# Patient Record
Sex: MALE | Race: WHITE | NOT HISPANIC OR LATINO | URBAN - METROPOLITAN AREA
[De-identification: names, ages, dates, MRNs, and addresses within clinical notes are randomized per-mention and may not be internally consistent; named-entity substitution may affect disease eponyms.]

---

## 2020-08-29 ENCOUNTER — OFFICE VISIT (OUTPATIENT)
Dept: URBAN - METROPOLITAN AREA CLINIC 121 | Facility: CLINIC | Age: 70
End: 2020-08-29

## 2021-03-03 ENCOUNTER — OFFICE VISIT (OUTPATIENT)
Dept: URBAN - METROPOLITAN AREA CLINIC 60 | Facility: CLINIC | Age: 71
End: 2021-03-03

## 2021-04-29 ENCOUNTER — OFFICE VISIT (OUTPATIENT)
Dept: URBAN - METROPOLITAN AREA SURGERY CENTER 4 | Facility: SURGERY CENTER | Age: 71
End: 2021-04-29

## 2021-05-03 LAB — PATHOLOGY (INDENTED REPORT): (no result)

## 2021-05-13 ENCOUNTER — OFFICE VISIT (OUTPATIENT)
Dept: URBAN - METROPOLITAN AREA CLINIC 60 | Facility: CLINIC | Age: 71
End: 2021-05-13

## 2021-06-22 ENCOUNTER — OFFICE VISIT (OUTPATIENT)
Dept: URBAN - METROPOLITAN AREA CLINIC 63 | Facility: CLINIC | Age: 71
End: 2021-06-22

## 2021-06-23 ENCOUNTER — OFFICE VISIT (OUTPATIENT)
Dept: URBAN - METROPOLITAN AREA CLINIC 60 | Facility: CLINIC | Age: 71
End: 2021-06-23

## 2021-09-22 ENCOUNTER — OFFICE VISIT (OUTPATIENT)
Dept: URBAN - METROPOLITAN AREA CLINIC 60 | Facility: CLINIC | Age: 71
End: 2021-09-22

## 2021-10-11 ENCOUNTER — OFFICE VISIT (OUTPATIENT)
Dept: URBAN - METROPOLITAN AREA CLINIC 121 | Facility: CLINIC | Age: 71
End: 2021-10-11

## 2021-11-17 ENCOUNTER — OFFICE VISIT (OUTPATIENT)
Dept: URBAN - METROPOLITAN AREA CLINIC 60 | Facility: CLINIC | Age: 71
End: 2021-11-17

## 2021-11-24 ENCOUNTER — OFFICE VISIT (OUTPATIENT)
Dept: URBAN - METROPOLITAN AREA CLINIC 60 | Facility: CLINIC | Age: 71
End: 2021-11-24

## 2022-02-23 ENCOUNTER — OFFICE VISIT (OUTPATIENT)
Dept: URBAN - METROPOLITAN AREA CLINIC 60 | Facility: CLINIC | Age: 72
End: 2022-02-23

## 2022-03-23 ENCOUNTER — OFFICE VISIT (OUTPATIENT)
Dept: URBAN - METROPOLITAN AREA CLINIC 60 | Facility: CLINIC | Age: 72
End: 2022-03-23

## 2022-07-09 ENCOUNTER — TELEPHONE ENCOUNTER (OUTPATIENT)
Dept: URBAN - METROPOLITAN AREA CLINIC 121 | Facility: CLINIC | Age: 72
End: 2022-07-09

## 2022-07-09 RX ORDER — LORATADINE 10 MG/1
TABLET ORAL ONCE A DAY
Refills: 0 | OUTPATIENT
Start: 2021-03-03 | End: 2021-05-13

## 2022-07-09 RX ORDER — CHOLESTYRAMINE 4 G/5.5G
POWDER, FOR SUSPENSION ORAL ONCE A DAY
Refills: 0 | OUTPATIENT
Start: 2021-02-22 | End: 2021-05-13

## 2022-07-09 RX ORDER — CELECOXIB 400 MG/1
CAPSULE ORAL
Refills: 0 | OUTPATIENT
Start: 2021-11-24 | End: 2022-02-23

## 2022-07-09 RX ORDER — CELECOXIB 400 MG/1
CAPSULE ORAL
Refills: 0 | OUTPATIENT
Start: 2021-05-13 | End: 2021-09-22

## 2022-07-09 RX ORDER — CELECOXIB 100 MG/1
CAPSULE ORAL TAKE AS DIRECTED
Refills: 0 | OUTPATIENT
Start: 2021-03-03 | End: 2021-05-13

## 2022-07-09 RX ORDER — ELECTROLYTES/DEXTROSE
SOLUTION, ORAL ORAL ONCE A DAY
Refills: 0 | OUTPATIENT
Start: 2021-09-22 | End: 2021-11-24

## 2022-07-09 RX ORDER — MESALAMINE 1.2 G/1
2 TWICE A DAY TABLET, DELAYED RELEASE ORAL
Refills: 3 | OUTPATIENT
Start: 2022-01-04 | End: 2022-03-23

## 2022-07-09 RX ORDER — AMLODIPINE BESYLATE 10 MG/1
TABLET ORAL ONCE A DAY
Refills: 0 | OUTPATIENT
Start: 2021-11-24 | End: 2022-02-23

## 2022-07-09 RX ORDER — LISINOPRIL 20 MG/1
TABLET ORAL TWICE A DAY
Refills: 0 | OUTPATIENT
Start: 2021-09-22 | End: 2021-11-24

## 2022-07-09 RX ORDER — CITALOPRAM 20 MG/1
TABLET ORAL ONCE A DAY
Refills: 0 | OUTPATIENT
Start: 2021-05-13 | End: 2021-09-22

## 2022-07-09 RX ORDER — METOPROLOL TARTRATE 50 MG/1
TABLET, FILM COATED ORAL TWICE A DAY
Refills: 0 | OUTPATIENT
Start: 2021-05-13 | End: 2021-09-22

## 2022-07-09 RX ORDER — FLUTICASONE PROPIONATE 50 UG/1
PRN SPRAY, METERED NASAL
Refills: 0 | OUTPATIENT
Start: 2021-11-24 | End: 2022-02-23

## 2022-07-09 RX ORDER — DIGOXIN 0.25 MG/1
TABLET ORAL
Refills: 0 | OUTPATIENT
Start: 2021-11-24 | End: 2022-02-23

## 2022-07-09 RX ORDER — LORAZEPAM 2 MG/1
TABLET ORAL ONCE A DAY
Refills: 0 | OUTPATIENT
Start: 2021-02-15 | End: 2021-05-13

## 2022-07-09 RX ORDER — CITALOPRAM 20 MG/1
TABLET ORAL ONCE A DAY
Refills: 0 | OUTPATIENT
Start: 2021-11-24 | End: 2022-02-23

## 2022-07-09 RX ORDER — METOPROLOL TARTRATE 50 MG/1
TABLET, FILM COATED ORAL TWICE A DAY
Refills: 0 | OUTPATIENT
Start: 2021-11-24 | End: 2022-02-23

## 2022-07-09 RX ORDER — AMLODIPINE BESYLATE 10 MG/1
TABLET ORAL ONCE A DAY
Refills: 0 | OUTPATIENT
Start: 2021-06-22 | End: 2021-09-22

## 2022-07-09 RX ORDER — ALIROCUMAB 75 MG/ML
INJECTION, SOLUTION SUBCUTANEOUS ONCE A DAY
Refills: 0 | OUTPATIENT
Start: 2021-02-02 | End: 2021-05-13

## 2022-07-09 RX ORDER — FLUTICASONE PROPIONATE 50 UG/1
PRN SPRAY, METERED NASAL
Refills: 0 | OUTPATIENT
Start: 2021-09-22 | End: 2021-11-24

## 2022-07-09 RX ORDER — GUAIFEN/PHENYLEPH/ACETAMINOPHN 200-5-325
TABLET ORAL ONCE A DAY
Refills: 0 | OUTPATIENT
Start: 2021-03-03 | End: 2021-05-13

## 2022-07-09 RX ORDER — CITALOPRAM 20 MG/1
TABLET ORAL ONCE A DAY
Refills: 0 | OUTPATIENT
Start: 2021-02-24 | End: 2021-05-13

## 2022-07-09 RX ORDER — LISINOPRIL 20 MG/1
TABLET ORAL TWICE A DAY
Refills: 0 | OUTPATIENT
Start: 2021-11-24 | End: 2022-02-23

## 2022-07-09 RX ORDER — MESALAMINE 1.2 G/1
2 TWICE A DAY TABLET, DELAYED RELEASE ORAL
Refills: 1 | OUTPATIENT
Start: 2021-10-07 | End: 2021-11-22

## 2022-07-09 RX ORDER — AMLODIPINE BESYLATE 10 MG/1
TABLET ORAL
Refills: 0 | OUTPATIENT
Start: 2022-02-19 | End: 2022-02-23

## 2022-07-09 RX ORDER — PANTOPRAZOLE SODIUM 40 MG/1
GRANULE, DELAYED RELEASE ORAL ONCE A DAY
Refills: 0 | OUTPATIENT
Start: 2021-11-24 | End: 2022-02-23

## 2022-07-09 RX ORDER — ALIROCUMAB 75 MG/ML
INJECTION, SOLUTION SUBCUTANEOUS ONCE A DAY
Refills: 0 | OUTPATIENT
Start: 2021-09-22 | End: 2021-11-24

## 2022-07-09 RX ORDER — ASPIRIN 81 MG/1
TABLET, FILM COATED ORAL ONCE A DAY
Refills: 0 | OUTPATIENT
Start: 2021-03-03 | End: 2021-05-13

## 2022-07-09 RX ORDER — FLUTICASONE PROPIONATE 50 UG/1
PRN SPRAY, METERED NASAL
Refills: 0 | OUTPATIENT
Start: 2021-05-13 | End: 2021-09-22

## 2022-07-09 RX ORDER — CITALOPRAM 20 MG/1
TABLET ORAL ONCE A DAY
Refills: 0 | OUTPATIENT
Start: 2021-09-22 | End: 2021-11-24

## 2022-07-09 RX ORDER — ALIROCUMAB 75 MG/ML
INJECTION, SOLUTION SUBCUTANEOUS ONCE A DAY
Refills: 0 | OUTPATIENT
Start: 2021-05-13 | End: 2021-09-22

## 2022-07-09 RX ORDER — FLUTICASONE PROPIONATE 50 UG/1
PRN SPRAY, METERED NASAL
Refills: 0 | OUTPATIENT
Start: 2021-05-13 | End: 2021-05-13

## 2022-07-09 RX ORDER — WARFARIN 5 MG/1
TABLET ORAL
Refills: 0 | OUTPATIENT
Start: 2022-02-18 | End: 2022-02-23

## 2022-07-09 RX ORDER — ALIROCUMAB 75 MG/ML
INJECTION, SOLUTION SUBCUTANEOUS ONCE A DAY
Refills: 0 | OUTPATIENT
Start: 2021-11-24 | End: 2022-02-23

## 2022-07-09 RX ORDER — LORATADINE 10 MG/1
TABLET ORAL ONCE A DAY
Refills: 0 | OUTPATIENT
Start: 2021-11-24 | End: 2022-02-23

## 2022-07-09 RX ORDER — CHOLESTYRAMINE 4 G/5.5G
POWDER, FOR SUSPENSION ORAL ONCE A DAY
Refills: 0 | OUTPATIENT
Start: 2021-05-13 | End: 2021-09-22

## 2022-07-09 RX ORDER — LORAZEPAM 2 MG/1
TABLET ORAL ONCE A DAY
Refills: 0 | OUTPATIENT
Start: 2021-05-13 | End: 2021-09-22

## 2022-07-09 RX ORDER — CELECOXIB 100 MG/1
CAPSULE ORAL TAKE AS DIRECTED
Refills: 0 | OUTPATIENT
Start: 2021-05-13 | End: 2021-05-13

## 2022-07-09 RX ORDER — METOPROLOL TARTRATE 50 MG/1
TABLET, FILM COATED ORAL TWICE A DAY
Refills: 0 | OUTPATIENT
Start: 2021-02-13 | End: 2021-05-13

## 2022-07-09 RX ORDER — MV-MIN/FOLIC/VIT K/LYCOP/COQ10 200-100MCG
CAPSULE ORAL ONCE A DAY
Refills: 0 | OUTPATIENT
Start: 2021-03-03 | End: 2021-05-13

## 2022-07-09 RX ORDER — BUDESONIDE 3 MG/1
3 ONCE A DAY CAPSULE, COATED PELLETS ORAL
Refills: 1 | OUTPATIENT
Start: 2021-05-13 | End: 2022-02-23

## 2022-07-09 RX ORDER — FUROSEMIDE 20 MG/1
TABLET ORAL
Refills: 0 | OUTPATIENT
Start: 2021-11-24 | End: 2022-02-23

## 2022-07-09 RX ORDER — FUROSEMIDE 20 MG/1
TABLET ORAL
Refills: 0 | OUTPATIENT
Start: 2021-11-03 | End: 2021-11-24

## 2022-07-09 RX ORDER — WARFARIN 5 MG/1
TABLET ORAL ONCE A DAY
Refills: 0 | OUTPATIENT
Start: 2021-03-03 | End: 2021-05-13

## 2022-07-09 RX ORDER — ONDANSETRON 4 MG/1
USE AS DIRECTED ONE TABLET 30 MINUTES BEFORE EACH DOSE OF LAXATIVE FOR BOWEL PREP TABLET, ORALLY DISINTEGRATING ORAL
Refills: 0 | OUTPATIENT
Start: 2021-03-03 | End: 2021-11-24

## 2022-07-09 RX ORDER — LORAZEPAM 2 MG/1
TABLET ORAL ONCE A DAY
Refills: 0 | OUTPATIENT
Start: 2021-11-24 | End: 2022-02-23

## 2022-07-09 RX ORDER — BUDESONIDE 3 MG/1
3 ONCE A DAY CAPSULE, COATED PELLETS ORAL
Refills: 1 | OUTPATIENT
Start: 2021-05-04 | End: 2021-05-13

## 2022-07-09 RX ORDER — GUAIFEN/PHENYLEPH/ACETAMINOPHN 200-5-325
TABLET ORAL ONCE A DAY
Refills: 0 | OUTPATIENT
Start: 2021-11-24 | End: 2022-02-23

## 2022-07-09 RX ORDER — ASPIRIN 81 MG/1
TABLET, COATED ORAL ONCE A DAY
Refills: 0 | OUTPATIENT
Start: 2021-11-24 | End: 2022-02-23

## 2022-07-09 RX ORDER — AMLODIPINE BESYLATE 10 MG/1
TABLET ORAL ONCE A DAY
Refills: 0 | OUTPATIENT
Start: 2021-09-22 | End: 2021-11-24

## 2022-07-09 RX ORDER — GUAIFEN/PHENYLEPH/ACETAMINOPHN 200-5-325
TABLET ORAL ONCE A DAY
Refills: 0 | OUTPATIENT
Start: 2021-09-22 | End: 2021-11-24

## 2022-07-09 RX ORDER — WARFARIN 5 MG/1
TABLET ORAL ONCE A DAY
Refills: 0 | OUTPATIENT
Start: 2021-05-13 | End: 2021-09-22

## 2022-07-09 RX ORDER — DIGOXIN 0.25 MG/1
TABLET ORAL
Refills: 0 | OUTPATIENT
Start: 2021-11-05 | End: 2021-11-24

## 2022-07-09 RX ORDER — CELECOXIB 200 MG/1
CAPSULE ORAL
Refills: 0 | OUTPATIENT
Start: 2022-01-28 | End: 2022-02-23

## 2022-07-09 RX ORDER — WARFARIN 5 MG/1
TABLET ORAL ONCE A DAY
Refills: 0 | OUTPATIENT
Start: 2021-11-24 | End: 2022-02-23

## 2022-07-09 RX ORDER — LORAZEPAM 2 MG/1
TABLET ORAL
Refills: 0 | OUTPATIENT
Start: 2022-02-04 | End: 2022-02-23

## 2022-07-09 RX ORDER — METOPROLOL TARTRATE 50 MG/1
TABLET, FILM COATED ORAL TWICE A DAY
Refills: 0 | OUTPATIENT
Start: 2021-09-22 | End: 2021-11-24

## 2022-07-09 RX ORDER — CELECOXIB 400 MG/1
CAPSULE ORAL
Refills: 0 | OUTPATIENT
Start: 2021-09-22 | End: 2021-11-24

## 2022-07-09 RX ORDER — ELECTROLYTES/DEXTROSE
SOLUTION, ORAL ORAL ONCE A DAY
Refills: 0 | OUTPATIENT
Start: 2021-05-13 | End: 2021-05-13

## 2022-07-09 RX ORDER — PANTOPRAZOLE SODIUM 40 MG/1
GRANULE, DELAYED RELEASE ORAL ONCE A DAY
Refills: 0 | OUTPATIENT
Start: 2021-09-22 | End: 2021-11-24

## 2022-07-09 RX ORDER — MESALAMINE 1.2 G/1
2 TWICE A DAY TABLET, DELAYED RELEASE ORAL
Refills: 1 | OUTPATIENT
Start: 2021-11-22 | End: 2022-01-04

## 2022-07-09 RX ORDER — LORATADINE 10 MG/1
TABLET ORAL ONCE A DAY
Refills: 0 | OUTPATIENT
Start: 2021-09-22 | End: 2021-11-24

## 2022-07-09 RX ORDER — LORATADINE 10 MG/1
TABLET ORAL ONCE A DAY
Refills: 0 | OUTPATIENT
Start: 2021-05-13 | End: 2021-09-22

## 2022-07-09 RX ORDER — LISINOPRIL 10 MG/1
TABLET ORAL ONCE A DAY
Refills: 0 | OUTPATIENT
Start: 2021-02-08 | End: 2021-05-13

## 2022-07-09 RX ORDER — LORAZEPAM 2 MG/1
TABLET ORAL ONCE A DAY
Refills: 0 | OUTPATIENT
Start: 2021-09-22 | End: 2021-11-24

## 2022-07-09 RX ORDER — GUAIFEN/PHENYLEPH/ACETAMINOPHN 200-5-325
TABLET ORAL ONCE A DAY
Refills: 0 | OUTPATIENT
Start: 2021-05-13 | End: 2021-09-22

## 2022-07-09 RX ORDER — WARFARIN 5 MG/1
TABLET ORAL ONCE A DAY
Refills: 0 | OUTPATIENT
Start: 2021-09-22 | End: 2021-11-24

## 2022-07-09 RX ORDER — LISINOPRIL 20 MG/1
TABLET ORAL TWICE A DAY
Refills: 0 | OUTPATIENT
Start: 2021-06-22 | End: 2021-09-22

## 2022-07-09 RX ORDER — ASPIRIN 81 MG/1
TABLET, COATED ORAL ONCE A DAY
Refills: 0 | OUTPATIENT
Start: 2021-09-22 | End: 2021-11-24

## 2022-07-09 RX ORDER — ELECTROLYTES/DEXTROSE
SOLUTION, ORAL ORAL ONCE A DAY
Refills: 0 | OUTPATIENT
Start: 2021-05-13 | End: 2021-09-22

## 2022-07-09 RX ORDER — LISINOPRIL 10 MG/1
TABLET ORAL ONCE A DAY
Refills: 0 | OUTPATIENT
Start: 2021-05-13 | End: 2021-06-22

## 2022-07-09 RX ORDER — PANTOPRAZOLE SODIUM 40 MG/1
GRANULE, DELAYED RELEASE ORAL ONCE A DAY
Refills: 0 | OUTPATIENT
Start: 2021-05-13 | End: 2021-09-22

## 2022-07-09 RX ORDER — ELECTROLYTES/DEXTROSE
SOLUTION, ORAL ORAL ONCE A DAY
Refills: 0 | OUTPATIENT
Start: 2021-11-24 | End: 2022-02-23

## 2022-07-09 RX ORDER — PANTOPRAZOLE SODIUM 40 MG/1
GRANULE, DELAYED RELEASE ORAL ONCE A DAY
Refills: 0 | OUTPATIENT
Start: 2021-03-03 | End: 2021-05-13

## 2022-07-09 RX ORDER — ALIROCUMAB 75 MG/ML
INJECTION, SOLUTION SUBCUTANEOUS
Refills: 0 | OUTPATIENT
Start: 2022-02-15 | End: 2022-02-23

## 2022-07-10 ENCOUNTER — TELEPHONE ENCOUNTER (OUTPATIENT)
Dept: URBAN - METROPOLITAN AREA CLINIC 121 | Facility: CLINIC | Age: 72
End: 2022-07-10

## 2022-07-10 RX ORDER — CITALOPRAM 20 MG/1
TABLET ORAL ONCE A DAY
Refills: 0 | Status: ACTIVE | COMMUNITY
Start: 2022-02-23

## 2022-07-10 RX ORDER — AMLODIPINE BESYLATE 10 MG/1
TABLET ORAL
Refills: 0 | Status: ACTIVE | COMMUNITY
Start: 2022-02-23

## 2022-07-10 RX ORDER — PANTOPRAZOLE SODIUM 40 MG/1
GRANULE, DELAYED RELEASE ORAL ONCE A DAY
Refills: 0 | Status: ACTIVE | COMMUNITY
Start: 2022-02-23

## 2022-07-10 RX ORDER — FLUTICASONE PROPIONATE 50 UG/1
PRN SPRAY, METERED NASAL
Refills: 0 | Status: ACTIVE | COMMUNITY
Start: 2022-02-23

## 2022-07-10 RX ORDER — ELECTROLYTES/DEXTROSE
SOLUTION, ORAL ORAL ONCE A DAY
Refills: 0 | Status: ACTIVE | COMMUNITY
Start: 2022-02-23

## 2022-07-10 RX ORDER — WARFARIN 5 MG/1
TABLET ORAL
Refills: 0 | Status: ACTIVE | COMMUNITY
Start: 2022-02-23

## 2022-07-10 RX ORDER — MESALAMINE 400 MG/1
6 CAPS TWICE DAILY CAPSULE, DELAYED RELEASE ORAL
Refills: 3 | Status: ACTIVE | COMMUNITY
Start: 2022-02-25

## 2022-07-10 RX ORDER — ALIROCUMAB 75 MG/ML
INJECTION, SOLUTION SUBCUTANEOUS
Refills: 0 | Status: ACTIVE | COMMUNITY
Start: 2022-02-23

## 2022-07-10 RX ORDER — CELECOXIB 200 MG/1
CAPSULE ORAL
Refills: 0 | Status: ACTIVE | COMMUNITY
Start: 2022-02-23

## 2022-07-10 RX ORDER — GUAIFEN/PHENYLEPH/ACETAMINOPHN 200-5-325
TABLET ORAL ONCE A DAY
Refills: 0 | Status: ACTIVE | COMMUNITY
Start: 2022-02-23

## 2022-07-10 RX ORDER — LORAZEPAM 2 MG/1
TABLET ORAL
Refills: 0 | Status: ACTIVE | COMMUNITY
Start: 2022-02-23

## 2022-07-10 RX ORDER — METOPROLOL SUCCINATE 25 MG/1
TABLET, EXTENDED RELEASE ORAL
Refills: 0 | Status: ACTIVE | COMMUNITY
Start: 2022-02-23

## 2022-07-10 RX ORDER — MESALAMINE 400 MG/1
3 FOUR TIMES A DAY CAPSULE, DELAYED RELEASE ORAL
Refills: 11 | Status: ACTIVE | COMMUNITY
Start: 2022-03-23

## 2022-11-15 ENCOUNTER — WEB ENCOUNTER (OUTPATIENT)
Dept: URBAN - METROPOLITAN AREA CLINIC 60 | Facility: CLINIC | Age: 72
End: 2022-11-15

## 2022-11-17 ENCOUNTER — OFFICE VISIT (OUTPATIENT)
Dept: URBAN - METROPOLITAN AREA CLINIC 60 | Facility: CLINIC | Age: 72
End: 2022-11-17
Payer: MEDICARE

## 2022-11-17 VITALS
BODY MASS INDEX: 32.07 KG/M2 | OXYGEN SATURATION: 98 % | TEMPERATURE: 98 F | HEART RATE: 70 BPM | SYSTOLIC BLOOD PRESSURE: 132 MMHG | HEIGHT: 70 IN | WEIGHT: 224 LBS | DIASTOLIC BLOOD PRESSURE: 74 MMHG

## 2022-11-17 DIAGNOSIS — I35.9 NONRHEUMATIC AORTIC VALVE DISORDER, UNSPECIFIED: ICD-10-CM

## 2022-11-17 DIAGNOSIS — Z79.01 LONG TERM (CURRENT) USE OF ANTICOAGULANTS: ICD-10-CM

## 2022-11-17 DIAGNOSIS — K52.832 LYMPHOCYTIC COLITIS: ICD-10-CM

## 2022-11-17 PROCEDURE — 99213 OFFICE O/P EST LOW 20 MIN: CPT | Performed by: NURSE PRACTITIONER

## 2022-11-17 RX ORDER — FLUTICASONE PROPIONATE 50 UG/1
PRN SPRAY, METERED NASAL
Refills: 0 | COMMUNITY
Start: 2022-02-23

## 2022-11-17 RX ORDER — LORAZEPAM 2 MG/1
TABLET ORAL
Refills: 0 | COMMUNITY
Start: 2022-02-23

## 2022-11-17 RX ORDER — WARFARIN 5 MG/1
TABLET ORAL
Refills: 0 | COMMUNITY
Start: 2022-02-23

## 2022-11-17 RX ORDER — GUAIFEN/PHENYLEPH/ACETAMINOPHN 200-5-325
TABLET ORAL ONCE A DAY
Refills: 0 | COMMUNITY
Start: 2022-02-23

## 2022-11-17 RX ORDER — AMLODIPINE BESYLATE 10 MG/1
TABLET ORAL
Refills: 0 | COMMUNITY
Start: 2022-02-23

## 2022-11-17 RX ORDER — MESALAMINE 400 MG/1
3 FOUR TIMES A DAY CAPSULE, DELAYED RELEASE ORAL
Refills: 11 | COMMUNITY
Start: 2022-03-23

## 2022-11-17 RX ORDER — CELECOXIB 200 MG/1
CAPSULE ORAL
Refills: 0 | COMMUNITY
Start: 2022-02-23

## 2022-11-17 RX ORDER — METOPROLOL SUCCINATE 25 MG/1
TABLET, EXTENDED RELEASE ORAL
Refills: 0 | COMMUNITY
Start: 2022-02-23

## 2022-11-17 RX ORDER — CITALOPRAM 20 MG/1
TABLET ORAL ONCE A DAY
Refills: 0 | COMMUNITY
Start: 2022-02-23

## 2022-11-17 RX ORDER — ELECTROLYTES/DEXTROSE
SOLUTION, ORAL ORAL ONCE A DAY
Refills: 0 | COMMUNITY
Start: 2022-02-23

## 2022-11-17 RX ORDER — ALIROCUMAB 75 MG/ML
INJECTION, SOLUTION SUBCUTANEOUS
Refills: 0 | COMMUNITY
Start: 2022-02-23

## 2022-11-17 RX ORDER — PANTOPRAZOLE SODIUM 40 MG/1
GRANULE, DELAYED RELEASE ORAL ONCE A DAY
Refills: 0 | COMMUNITY
Start: 2022-02-23

## 2022-11-17 NOTE — HPI-TODAY'S VISIT:
Fu of lymphocytic colitis Taking delzicol 4.8g/day through [email or web address]. Doing great symptomatically, 1-2 bm per day without loose stools or abdominal pain. Had more diarrhea more frequently prior to having covid in august

## 2023-09-14 ENCOUNTER — DASHBOARD ENCOUNTERS (OUTPATIENT)
Age: 73
End: 2023-09-14

## 2023-09-14 ENCOUNTER — OFFICE VISIT (OUTPATIENT)
Dept: URBAN - METROPOLITAN AREA CLINIC 60 | Facility: CLINIC | Age: 73
End: 2023-09-14
Payer: MEDICARE

## 2023-09-14 VITALS
HEIGHT: 70 IN | BODY MASS INDEX: 31.21 KG/M2 | HEART RATE: 78 BPM | OXYGEN SATURATION: 97 % | RESPIRATION RATE: 12 BRPM | DIASTOLIC BLOOD PRESSURE: 80 MMHG | WEIGHT: 218 LBS | TEMPERATURE: 98.2 F | SYSTOLIC BLOOD PRESSURE: 148 MMHG

## 2023-09-14 DIAGNOSIS — K52.832 LYMPHOCYTIC COLITIS: ICD-10-CM

## 2023-09-14 PROCEDURE — 99213 OFFICE O/P EST LOW 20 MIN: CPT | Performed by: NURSE PRACTITIONER

## 2023-09-14 RX ORDER — CELECOXIB 200 MG/1
CAPSULE ORAL
Refills: 0 | Status: ACTIVE | COMMUNITY
Start: 2022-02-23

## 2023-09-14 RX ORDER — METOPROLOL SUCCINATE 25 MG/1
TABLET, EXTENDED RELEASE ORAL
Refills: 0 | Status: ACTIVE | COMMUNITY
Start: 2022-02-23

## 2023-09-14 RX ORDER — LORAZEPAM 2 MG/1
TABLET ORAL
Refills: 0 | Status: ACTIVE | COMMUNITY
Start: 2022-02-23

## 2023-09-14 RX ORDER — WARFARIN 5 MG/1
TABLET ORAL
Refills: 0 | Status: ACTIVE | COMMUNITY
Start: 2022-02-23

## 2023-09-14 RX ORDER — ALIROCUMAB 75 MG/ML
INJECTION, SOLUTION SUBCUTANEOUS
Refills: 0 | Status: ACTIVE | COMMUNITY
Start: 2022-02-23

## 2023-09-14 RX ORDER — PANTOPRAZOLE SODIUM 40 MG/1
GRANULE, DELAYED RELEASE ORAL ONCE A DAY
Refills: 0 | Status: ACTIVE | COMMUNITY
Start: 2022-02-23

## 2023-09-14 RX ORDER — GUAIFEN/PHENYLEPH/ACETAMINOPHN 200-5-325
TABLET ORAL ONCE A DAY
Refills: 0 | Status: ACTIVE | COMMUNITY
Start: 2022-02-23

## 2023-09-14 RX ORDER — FLUTICASONE PROPIONATE 50 UG/1
PRN SPRAY, METERED NASAL
Refills: 0 | Status: ACTIVE | COMMUNITY
Start: 2022-02-23

## 2023-09-14 RX ORDER — CITALOPRAM 20 MG/1
TABLET ORAL ONCE A DAY
Refills: 0 | Status: ACTIVE | COMMUNITY
Start: 2022-02-23

## 2023-09-14 RX ORDER — GABAPENTIN 300 MG/1
TAKE ONE CAPSULE BY MOUTH ONE TIME DAILY AT NIGHT CAPSULE ORAL
Qty: 90 UNSPECIFIED | Refills: 0 | Status: ACTIVE | COMMUNITY

## 2023-09-14 RX ORDER — MESALAMINE 400 MG/1
3 FOUR TIMES A DAY CAPSULE, DELAYED RELEASE ORAL
Refills: 11 | Status: ACTIVE | COMMUNITY
Start: 2022-03-23

## 2023-09-14 NOTE — HPI-TODAY'S VISIT:
Fu of lymphocytic colitis Taking delzicol 400mg 2 tabs twice daily through [facility]. Typically having normal stools 9/10 days, diarrhea 2-3 times per month with response to 1 tab of imodium.

## 2023-11-22 ENCOUNTER — TELEPHONE ENCOUNTER (OUTPATIENT)
Dept: URBAN - METROPOLITAN AREA CLINIC 64 | Facility: CLINIC | Age: 73
End: 2023-11-22

## 2023-12-21 ENCOUNTER — TELEPHONE ENCOUNTER (OUTPATIENT)
Dept: URBAN - METROPOLITAN AREA CLINIC 63 | Facility: CLINIC | Age: 73
End: 2023-12-21

## 2024-05-20 ENCOUNTER — WEB ENCOUNTER (OUTPATIENT)
Dept: URBAN - METROPOLITAN AREA CLINIC 63 | Facility: CLINIC | Age: 74
End: 2024-05-20

## 2024-05-24 ENCOUNTER — TELEPHONE ENCOUNTER (OUTPATIENT)
Dept: URBAN - METROPOLITAN AREA CLINIC 60 | Facility: CLINIC | Age: 74
End: 2024-05-24